# Patient Record
Sex: MALE | Race: WHITE
[De-identification: names, ages, dates, MRNs, and addresses within clinical notes are randomized per-mention and may not be internally consistent; named-entity substitution may affect disease eponyms.]

---

## 2018-12-18 ENCOUNTER — HOSPITAL ENCOUNTER (INPATIENT)
Dept: HOSPITAL 54 - ER | Age: 53
LOS: 2 days | Discharge: HOME | DRG: 303 | End: 2018-12-20
Attending: INTERNAL MEDICINE | Admitting: INTERNAL MEDICINE
Payer: COMMERCIAL

## 2018-12-18 VITALS — WEIGHT: 174.38 LBS | BODY MASS INDEX: 23.62 KG/M2 | HEIGHT: 72 IN

## 2018-12-18 VITALS — SYSTOLIC BLOOD PRESSURE: 149 MMHG | DIASTOLIC BLOOD PRESSURE: 87 MMHG

## 2018-12-18 VITALS — DIASTOLIC BLOOD PRESSURE: 80 MMHG | SYSTOLIC BLOOD PRESSURE: 114 MMHG

## 2018-12-18 DIAGNOSIS — F11.20: ICD-10-CM

## 2018-12-18 DIAGNOSIS — E78.5: ICD-10-CM

## 2018-12-18 DIAGNOSIS — I25.2: ICD-10-CM

## 2018-12-18 DIAGNOSIS — Z79.84: ICD-10-CM

## 2018-12-18 DIAGNOSIS — G89.29: ICD-10-CM

## 2018-12-18 DIAGNOSIS — Z79.82: ICD-10-CM

## 2018-12-18 DIAGNOSIS — I10: ICD-10-CM

## 2018-12-18 DIAGNOSIS — E83.42: ICD-10-CM

## 2018-12-18 DIAGNOSIS — Z82.49: ICD-10-CM

## 2018-12-18 DIAGNOSIS — K21.9: ICD-10-CM

## 2018-12-18 DIAGNOSIS — E11.65: ICD-10-CM

## 2018-12-18 DIAGNOSIS — F17.200: ICD-10-CM

## 2018-12-18 DIAGNOSIS — Z88.8: ICD-10-CM

## 2018-12-18 DIAGNOSIS — I25.10: Primary | ICD-10-CM

## 2018-12-18 LAB
BASOPHILS # BLD AUTO: 0.1 /CMM (ref 0–0.2)
BASOPHILS NFR BLD AUTO: 1.1 % (ref 0–2)
BUN SERPL-MCNC: 12 MG/DL (ref 7–18)
CALCIUM SERPL-MCNC: 8.5 MG/DL (ref 8.5–10.1)
CHLORIDE SERPL-SCNC: 104 MMOL/L (ref 98–107)
CO2 SERPL-SCNC: 28 MMOL/L (ref 21–32)
CREAT SERPL-MCNC: 0.9 MG/DL (ref 0.6–1.3)
EOSINOPHIL NFR BLD AUTO: 0.4 % (ref 0–6)
GLUCOSE SERPL-MCNC: 137 MG/DL (ref 74–106)
HCT VFR BLD AUTO: 44 % (ref 39–51)
HGB BLD-MCNC: 14.7 G/DL (ref 13.5–17.5)
LYMPHOCYTES NFR BLD AUTO: 1.3 /CMM (ref 0.8–4.8)
LYMPHOCYTES NFR BLD AUTO: 19.9 % (ref 20–44)
MCHC RBC AUTO-ENTMCNC: 33 G/DL (ref 31–36)
MCV RBC AUTO: 93 FL (ref 80–96)
MONOCYTES NFR BLD AUTO: 0.8 /CMM (ref 0.1–1.3)
MONOCYTES NFR BLD AUTO: 11.6 % (ref 2–12)
NEUTROPHILS # BLD AUTO: 4.5 /CMM (ref 1.8–8.9)
NEUTROPHILS NFR BLD AUTO: 67 % (ref 43–81)
NT-PROBNP SERPL-MCNC: 381 PG/ML (ref 0–125)
PLATELET # BLD AUTO: 211 /CMM (ref 150–450)
POTASSIUM SERPL-SCNC: 4.1 MMOL/L (ref 3.5–5.1)
RBC # BLD AUTO: 4.77 MIL/UL (ref 4.5–6)
SODIUM SERPL-SCNC: 140 MMOL/L (ref 136–145)
WBC NRBC COR # BLD AUTO: 6.7 K/UL (ref 4.3–11)

## 2018-12-18 PROCEDURE — Z7610: HCPCS

## 2018-12-18 PROCEDURE — G0480 DRUG TEST DEF 1-7 CLASSES: HCPCS

## 2018-12-18 PROCEDURE — A4606 OXYGEN PROBE USED W OXIMETER: HCPCS

## 2018-12-18 PROCEDURE — A9502 TC99M TETROFOSMIN: HCPCS

## 2018-12-18 PROCEDURE — G0378 HOSPITAL OBSERVATION PER HR: HCPCS

## 2018-12-18 RX ADMIN — HYDROCODONE BITARTRATE AND ACETAMINOPHEN PRN EA: 10; 325 TABLET ORAL at 22:29

## 2018-12-18 RX ADMIN — ATORVASTATIN CALCIUM SCH MG: 40 TABLET, FILM COATED ORAL at 22:26

## 2018-12-18 RX ADMIN — ENOXAPARIN SODIUM SCH MG: 40 INJECTION SUBCUTANEOUS at 22:28

## 2018-12-18 NOTE — NUR
received pt from [facility]. Awaiting admission for weakness, SOB, CP, inability 
to sleep.  Pt is A, O/4, able to move all extremities without difficulty, 
appears anxious.

## 2018-12-18 NOTE — NUR
BIB ra c/o weakness and sob x 2 weeks. Alert and oriented x 4, verbally 
responsive, and able to make needs known. on room air, breathing evenly, and 
unlabored. chicho on the monitor. Dr. Rojas at bedside for eval. kept 
comfortable, will continue to monitor accordingly.

## 2018-12-18 NOTE — NUR
TELE/RN NOTES



RECEIVED PT. FROM ER VIA SAV. PT. IS AWAKE, ALERT AND ORIENTED X4. BREATHING EVEN AND 
UNLABORED ON ROOM AIR. NO SOB, RESPIRATORY DISTRESS OR COMPLAINTS OF PAIN NOTED AT THIS 
TIME. ORIENTED PT. TO ROOM. PLACED EXTERNAL CARDIAC MONITOR ON PT. CURRENT RHYTHM = SINUS 
RHYTHM HR 75. PT. WITH RIGHT AC 20 GAUGE IV SALINE LOCK PRESENT, PATENT AND INTACT. PT. 
FAMILY MEMBER PRESENT AT BEDSIDE. BED LOCKED AND IN LOWEST POSITION, SIDE RAILS UP X3, CALL 
LIGHT WITHIN REACH, WILL CONTINUE TO MONITOR.

## 2018-12-19 VITALS — DIASTOLIC BLOOD PRESSURE: 76 MMHG | SYSTOLIC BLOOD PRESSURE: 118 MMHG

## 2018-12-19 VITALS — DIASTOLIC BLOOD PRESSURE: 71 MMHG | SYSTOLIC BLOOD PRESSURE: 114 MMHG

## 2018-12-19 VITALS — SYSTOLIC BLOOD PRESSURE: 126 MMHG | DIASTOLIC BLOOD PRESSURE: 68 MMHG

## 2018-12-19 VITALS — SYSTOLIC BLOOD PRESSURE: 119 MMHG | DIASTOLIC BLOOD PRESSURE: 78 MMHG

## 2018-12-19 VITALS — SYSTOLIC BLOOD PRESSURE: 122 MMHG | DIASTOLIC BLOOD PRESSURE: 75 MMHG

## 2018-12-19 LAB
BASOPHILS # BLD AUTO: 0.1 /CMM (ref 0–0.2)
BASOPHILS NFR BLD AUTO: 1 % (ref 0–2)
BUN SERPL-MCNC: 17 MG/DL (ref 7–18)
CALCIUM SERPL-MCNC: 8.9 MG/DL (ref 8.5–10.1)
CHLORIDE SERPL-SCNC: 102 MMOL/L (ref 98–107)
CHOLEST SERPL-MCNC: 67 MG/DL (ref ?–200)
CO2 SERPL-SCNC: 25 MMOL/L (ref 21–32)
CREAT SERPL-MCNC: 1 MG/DL (ref 0.6–1.3)
EOSINOPHIL NFR BLD AUTO: 0.8 % (ref 0–6)
GLUCOSE SERPL-MCNC: 150 MG/DL (ref 74–106)
HCT VFR BLD AUTO: 46 % (ref 39–51)
HDLC SERPL-MCNC: 26 MG/DL (ref 40–60)
HGB BLD-MCNC: 15.2 G/DL (ref 13.5–17.5)
LDLC SERPL DIRECT ASSAY-MCNC: 25 MG/DL (ref 0–99)
LYMPHOCYTES NFR BLD AUTO: 2.5 /CMM (ref 0.8–4.8)
LYMPHOCYTES NFR BLD AUTO: 26.3 % (ref 20–44)
MAGNESIUM SERPL-MCNC: 1.5 MG/DL (ref 1.8–2.4)
MCHC RBC AUTO-ENTMCNC: 33 G/DL (ref 31–36)
MCV RBC AUTO: 92 FL (ref 80–96)
MONOCYTES NFR BLD AUTO: 1.2 /CMM (ref 0.1–1.3)
MONOCYTES NFR BLD AUTO: 12.2 % (ref 2–12)
NEUTROPHILS # BLD AUTO: 5.8 /CMM (ref 1.8–8.9)
NEUTROPHILS NFR BLD AUTO: 59.7 % (ref 43–81)
PHOSPHATE SERPL-MCNC: 3.9 MG/DL (ref 2.5–4.9)
PLATELET # BLD AUTO: 231 /CMM (ref 150–450)
POTASSIUM SERPL-SCNC: 3.5 MMOL/L (ref 3.5–5.1)
RBC # BLD AUTO: 5.02 MIL/UL (ref 4.5–6)
SODIUM SERPL-SCNC: 139 MMOL/L (ref 136–145)
TRIGL SERPL-MCNC: 163 MG/DL (ref 30–150)
WBC NRBC COR # BLD AUTO: 9.7 K/UL (ref 4.3–11)

## 2018-12-19 RX ADMIN — HYDROCODONE BITARTRATE AND ACETAMINOPHEN PRN EA: 10; 325 TABLET ORAL at 04:31

## 2018-12-19 RX ADMIN — HYDROCODONE BITARTRATE AND ACETAMINOPHEN PRN EA: 10; 325 TABLET ORAL at 22:03

## 2018-12-19 RX ADMIN — METOPROLOL SUCCINATE SCH MG: 50 TABLET, EXTENDED RELEASE ORAL at 11:20

## 2018-12-19 RX ADMIN — MAGNESIUM SULFATE IN DEXTROSE SCH MLS/HR: 10 INJECTION, SOLUTION INTRAVENOUS at 13:53

## 2018-12-19 RX ADMIN — MORPHINE SULFATE SCH MG: 30 TABLET, EXTENDED RELEASE ORAL at 11:17

## 2018-12-19 RX ADMIN — ASPIRIN 81 MG SCH MG: 81 TABLET ORAL at 11:19

## 2018-12-19 RX ADMIN — Medication SCH MG: at 11:20

## 2018-12-19 RX ADMIN — ENOXAPARIN SODIUM SCH MG: 40 INJECTION SUBCUTANEOUS at 20:31

## 2018-12-19 RX ADMIN — METFORMIN HYDROCHLORIDE SCH MG: 500 TABLET, FILM COATED ORAL at 11:19

## 2018-12-19 RX ADMIN — METFORMIN HYDROCHLORIDE SCH MG: 500 TABLET, FILM COATED ORAL at 16:10

## 2018-12-19 RX ADMIN — ATORVASTATIN CALCIUM SCH MG: 40 TABLET, FILM COATED ORAL at 22:03

## 2018-12-19 RX ADMIN — LISINOPRIL SCH MG: 20 TABLET ORAL at 11:20

## 2018-12-19 RX ADMIN — MORPHINE SULFATE SCH MG: 30 TABLET, EXTENDED RELEASE ORAL at 20:32

## 2018-12-19 RX ADMIN — MAGNESIUM SULFATE IN DEXTROSE SCH MLS/HR: 10 INJECTION, SOLUTION INTRAVENOUS at 15:01

## 2018-12-19 RX ADMIN — HYDROCODONE BITARTRATE AND ACETAMINOPHEN PRN EA: 10; 325 TABLET ORAL at 14:37

## 2018-12-19 NOTE — NUR
MS/RN   NOTE



THE PATIENT ALERT AND ORIENTED X4. IN ROOM AIR AND DENIES SOB. SATURATION IN ROOM AIR AT 
97%. RC G 20 PATENT AND SALINE LOCKED. BED LOW AND LOCKED. SIDE RAILS UP X3. CALL LIGHT 
WITHIN REACH. WILL ENDORSE TO NIGHT SHIFT.

## 2018-12-19 NOTE — NUR
TELE/RN NOTES



PT. IS LYING IN BED. AWAKE, ALERT AND ORIENTED X4. BREATHING EVEN AND UNLABORED ON ROOM AIR. 
NO SOB OR RESPIRATORY DISTRESS NOTED AT THIS TIME AND THROUGHOUT SHIFT. NO COMPLAINTS OF 
CHEST PAIN NOTED. PT. COMPLAINING OF BACK PAIN. PAIN MEDICATION ADMINISTERED TO PT. AS 
ORDERED. PT. WITH EXTERNAL CARDIAC MONITOR PRESENT AND INTACT, PT. CURRENT RHYTHM = SINUS 
RHYTHM HR 94. PT. WITH RIGHT AC 20 GAUGE IV SALINE LOCK PRESENT, PATENT AND INTACT. ALL PT. 
NEEDS MET. BED LOCKED AND IN LOWEST POSITION, SIDE RAILS UP X3, CALL LIGHT WITHIN REACH, 
WILL ENDORSE TO DAYSHIFT NURSE FOR CONTINUITY OF CARE.

## 2018-12-19 NOTE — NUR
RN NOTES

RECEIVED PT. AWAKE ON BED, A/OX4, AMBULATORY, SR ON TELE MONITOR HR-71, DENIES PAIN AT THIS 
TIME, NO SOB, CALL LIGHT WITHIN REACH, SIDERAILSUPX2, CONTINUE TO MONITOR

## 2018-12-19 NOTE — NUR
TELE/RN   NOTE



THE PATIENT COMPLAINED OF GENERALIZED BODY RATING 9/10. THE PATIENT DESCRIBED PAIN ACHING 
AND THAT IT IS HE HAS BEEN HAVING THIS PAIN FOR MANY YEARS. MORPHINE 30 MG PO WAS GIVEN PER 
ORDER. WILL CONTINUE FOR EFFECTIVENESS.

## 2018-12-19 NOTE — NUR
TELE/RN   NOTE



STILL WAITING FOR CTA. NO PROXIMATE TIME IS GIVEN WHAT TIME CTA WILL BE DONE. THE PATIENT IS 
EXPLAINED , PATIENT AGREED. MAGNESIUM 1G IV IS STARTED.

## 2018-12-19 NOTE — NUR
TELE/RN   NOTE



CALLED THE PATIENT`S PHARMACY ([facility] [phone]), SPOKE WITH GREG AND VERIFIED 
PAIN MEDICATIONS: ACCORDING TO GREG THE PATIENT IS ON NORCO  1 TAB PO Q6HR PRN, 
MORPHINE ER 30 MG Q12HR AND SOMA 250 MG Q12 HR. ACCORDING TO GREG THE PATIENT DOES NOT HAVE 
AN ORDER OF NORCO 5/325. SINDHU FROM [facility] IS MADE AWARE. WILL FOLLOW UP 
WITH DR BRADFORD TOO.

-------------------------------------------------------------------------------

Addendum: 12/19/18 at 1055 by DWAYNE LYN RN

-------------------------------------------------------------------------------

MS/RN  NOTE



DR BRADFORD IS MADE AWARE OF WHAT GREG FROM [facility] INFORMED REGARDING 
MEDICATIONS. RECEIVED ORDER FROM DR BRADFORD TO DISCONTINUE NORCO 5/325. NOTED AND CARRIED 
OUT. DR BRADFORD GAVE NEW ORDERS OF MORPHINE SULFATE SR 30 MG PO Q12HR AND SOMA 250 MG PO Q 
12HR. ALL THE ORDERS ARE READ BACK, VERIFIED. NOTED AND CARRIED OUT.  THE PATIENT IS MADE 
AWARE.

[facility] DOES NOT HAVE SOMA 250 MG. PATIENT AND GIRLFRIEND ARE MADE 
AWARE. GIRLFRIEND WILL PROVIDE FROM HOME.

## 2018-12-19 NOTE — NUR
TELE/RN NOTES



PT. IS GETTING AGITATED, COMPLAINING ABOUT BEING IN THE HOSPITAL STATING IF HE DOESN'T GO 
OUT TO SMOKE A CIGARETTE HE IS GOING TO LEAVE THE HOSPITAL. EDUCATED PT. ABOUT IMPORTANCE OF 
STAYING ON THE HOSPITAL FLOOR AND THAT HIS HEART RATE INCREASES WHEN HE IS WALKING. PT. 
STATES HE IS NO LONGER LIGHTHEADED OR DIZZY AND THAT HE WILL GO OUT IN A WHEELCHAIR. 
INFORMED CHARGE NURSE. PT. SIGNED SMOKING CONSENT FORM. PT. LEFT THE FLOOR TO SMOKE VIA 
WHEELCHAIR ESCORTED BY CNA.

## 2018-12-19 NOTE — NUR
TELE/RN  OPENING NOTE



THE PATIENT IS RECEIVED IN SITTING UP IN CHAIR. ALERT AND ORIENTED X4. DENIES PAIN AT THIS 
TIME. RESPIRATION REGULAR AND UNLABORED. IN ROOM AIR AND DENIES SOB. EXTERNAL TELE MONITOR 
READING IS SR 85. THE PATIENT VERBALIZED WANTING TO GO HOME. THE PATIENT IS EDUCATED THE 
IMPORTANCE OF WAITING TO SEE A DOCTOR AND THE PATIENT IS ENCOURAGED NOT TO LEAVE THE 
HOSPITAL AMA. THE PATIENT IS REMINDED TO REMAIN NPO UNTIL SEEN BY A CARDIOLOGIST. THE 
PATIENT SAID " I`LL TRY, I DON`KNOW." VERBAL CUES ARE GIVEN TO KEEP SAFETY AWARENESS HIGH. 
BED LOW AND LOCKED. SIDE RAILS UP X2. CALL LIGHT WITHIN REACH. WILL CONTINUE TO MONITOR.

## 2018-12-19 NOTE — NUR
TELE/RN   NOTE



PATIENT WAS SEEN BY DR STOCK. PER DR STOCK THE PATIENT MAY HAVE FOOD AT THIS TIME AND 
ADMINISTER MEDICATIONS.

## 2018-12-19 NOTE — NUR
TELE/RN   NOTE



0900 DUE MEDICATIONS ARE GIVEN LATE DUE KEEPING PATIENT NPO UNTIL TO BE SEEN BY DR STOCK. 
THE PATIENT WAS SEEN BY DR STOCK BUT AFTERWARD THE PATIENT WENT TO WALK WITH CNA AND WAS 
BACK AT 1119 AND THAT`S THE TIME WHEN 0900 MEDICATIONS WERE GIVEN. DR BRADFORD WAS AWARE.

## 2018-12-19 NOTE — NUR
TELE/RN   NOTE



MAGNESIUM 1G IV STARTED LATE DUE TO PATIENT WANTING TO GET THE MEDICATION AFTER CTA.

## 2018-12-20 VITALS — SYSTOLIC BLOOD PRESSURE: 127 MMHG | DIASTOLIC BLOOD PRESSURE: 78 MMHG

## 2018-12-20 VITALS — DIASTOLIC BLOOD PRESSURE: 74 MMHG | SYSTOLIC BLOOD PRESSURE: 110 MMHG

## 2018-12-20 VITALS — SYSTOLIC BLOOD PRESSURE: 124 MMHG | DIASTOLIC BLOOD PRESSURE: 84 MMHG

## 2018-12-20 VITALS — DIASTOLIC BLOOD PRESSURE: 80 MMHG | SYSTOLIC BLOOD PRESSURE: 120 MMHG

## 2018-12-20 VITALS — SYSTOLIC BLOOD PRESSURE: 138 MMHG | DIASTOLIC BLOOD PRESSURE: 83 MMHG

## 2018-12-20 VITALS — DIASTOLIC BLOOD PRESSURE: 72 MMHG | SYSTOLIC BLOOD PRESSURE: 121 MMHG

## 2018-12-20 LAB
BUN SERPL-MCNC: 15 MG/DL (ref 7–18)
CALCIUM SERPL-MCNC: 8.6 MG/DL (ref 8.5–10.1)
CHLORIDE SERPL-SCNC: 106 MMOL/L (ref 98–107)
CO2 SERPL-SCNC: 26 MMOL/L (ref 21–32)
CREAT SERPL-MCNC: 1 MG/DL (ref 0.6–1.3)
GLUCOSE SERPL-MCNC: 126 MG/DL (ref 74–106)
MAGNESIUM SERPL-MCNC: 1.7 MG/DL (ref 1.8–2.4)
POTASSIUM SERPL-SCNC: 4 MMOL/L (ref 3.5–5.1)
SODIUM SERPL-SCNC: 142 MMOL/L (ref 136–145)

## 2018-12-20 RX ADMIN — ASPIRIN 81 MG SCH MG: 81 TABLET ORAL at 10:41

## 2018-12-20 RX ADMIN — MORPHINE SULFATE SCH MG: 30 TABLET, EXTENDED RELEASE ORAL at 10:41

## 2018-12-20 RX ADMIN — METOPROLOL SUCCINATE SCH MG: 50 TABLET, EXTENDED RELEASE ORAL at 10:41

## 2018-12-20 RX ADMIN — METFORMIN HYDROCHLORIDE SCH MG: 500 TABLET, FILM COATED ORAL at 16:25

## 2018-12-20 RX ADMIN — Medication SCH MG: at 10:42

## 2018-12-20 RX ADMIN — HYDROCODONE BITARTRATE AND ACETAMINOPHEN PRN EA: 10; 325 TABLET ORAL at 11:26

## 2018-12-20 RX ADMIN — METFORMIN HYDROCHLORIDE SCH MG: 500 TABLET, FILM COATED ORAL at 09:00

## 2018-12-20 RX ADMIN — MAGNESIUM SULFATE IN DEXTROSE SCH MLS/HR: 10 INJECTION, SOLUTION INTRAVENOUS at 13:59

## 2018-12-20 RX ADMIN — LISINOPRIL SCH MG: 20 TABLET ORAL at 10:41

## 2018-12-20 RX ADMIN — MAGNESIUM SULFATE IN DEXTROSE SCH MLS/HR: 10 INJECTION, SOLUTION INTRAVENOUS at 12:46

## 2018-12-20 NOTE — NUR
TELE/RN   OPENING NOTE



THE PATIENT IS RECEIVED IN BED. PATIENT IS AWAKE, ALERT AND ORIENTED X4 AND ABLE TO MAKE 
NEEDS KNOWN VERBALLY. DENIES PAIN AT THIS TIME. IN ROOM AIR AND DENIES SOB. RESPIRATION 
REGULAR AND UNLABORED. SR 76 ON EXTERNAL TELE MONITOR. PATIENT REMAINS NPO SINCE MIDNIGHT. 
THE PATIENT IN STABLE CONDITION. LFA G 20 PATENT AND SALINE LOCKED. BED LOW AND LOCKED. SIDE 
RAILS UP X3. CALL LIGHT WITHIN REACH. WILL CONTINUE TO MONITOR.

## 2018-12-20 NOTE — NUR
TELE/RN   NOTE



RECEIVED CARDIAC DIET ORDER FROM DR BRADFORD. THE ORDER IS READ BACK, VERIFIED. NOTED AND 
CARRIED OUT. ALSO, PER DR BRADFORD OK TO GIVE 0900 MEDICATIONS AND PRN MEDICATIONS.

## 2018-12-20 NOTE — NUR
RN NOTES

AWAKE, DENIES PAIN, NO SOB.,MORNING CARE RENDERED, CALL LIGHT WITHIN REACH, SIDERAILSUPX2, 
PT. NEEDS ATTENDED

## 2018-12-20 NOTE — NUR
TELE/RN   NOTE



THE PATIENT COMPLIANT OF GENERALIZED PAIN 4/10. MORPHINE 30 MG GIVEN EARLIER. REST, 
REPOSITIONING IS DONE BUT THE PATIENT STILL HAS PAIN 4/10. NORCO 10/325 1 TAB PO GIVEN. 
RESPIRATION RATE IS 19. VITALS STABLE. WILL CONTINUE TO MONITOR.

## 2018-12-20 NOTE — NUR
TELE/RN   NOTE



THE PATIENT VERBALIZED NORCO BEING EFFECTIVE AND RATED PAIN 0/10. VITAL SIGNS ARE WNL.

## 2018-12-20 NOTE — NUR
TELE/RN  CLOSING NOTE



THE PATIENT ALERT AND ORIENTED X4. DENIES SOB. IN ROOM AIR AND SATURATION IS AT 98%. DENIES 
PAIN. LFA G 20 PATENT AND SALINE LOCKED. BED LOW AND LOCKED. SIDE RAILS UP X2. CALL LIGHT 
WITHIN REACH. WILL ENDORSE TO NIGHT SHIFT.

## 2018-12-20 NOTE — NUR
MS RN DISCHARGE NOTE



PT DISCHARGED IN STABLE CONDITION, A&O X4, NO SIGNS OF SOB OR DISTRESS. CURRENT VS:108/74, 
82,98.6, 18. 98% ON RA. DISCHARGE TEACHING WITH PATIENT DONE WITH VERBALIZATION OF 
UNDERSTANDING. ALL BELONGINGS ACCOUNTED AND SIGNED FOR. WIFE AT BEDSIDE AND PT LEFT VIA 
PERSONAL VEHICLE.

## 2018-12-20 NOTE — NUR
TELE/RN   NOTE



MADE DR STOCK AWARE OF [place] RESULT AND DR STOCK SAID "OK TO DISCHARGE THE PATIENT, TELL 
DR BRADFORD TO GIVE DISCHARGE ORDER."

## 2018-12-20 NOTE — NUR
TELE/RN   NOTE



DR BRADFORD IS MADE AWARE OF DR STOCK CLEARING THE PATIENT TO GO HOME. WAITING FOR DISCHARGE 
ORDER.

## 2019-08-14 ENCOUNTER — HOSPITAL ENCOUNTER (EMERGENCY)
Dept: HOSPITAL 54 - ER | Age: 54
Discharge: HOME | End: 2019-08-14
Payer: COMMERCIAL

## 2019-08-14 VITALS — WEIGHT: 192 LBS | HEIGHT: 70 IN | BODY MASS INDEX: 27.49 KG/M2

## 2019-08-14 VITALS — SYSTOLIC BLOOD PRESSURE: 134 MMHG | DIASTOLIC BLOOD PRESSURE: 85 MMHG

## 2019-08-14 DIAGNOSIS — R11.2: ICD-10-CM

## 2019-08-14 DIAGNOSIS — E11.9: ICD-10-CM

## 2019-08-14 DIAGNOSIS — N20.0: Primary | ICD-10-CM

## 2019-08-14 DIAGNOSIS — Z88.8: ICD-10-CM

## 2019-08-14 DIAGNOSIS — I10: ICD-10-CM

## 2019-08-14 DIAGNOSIS — Z98.890: ICD-10-CM

## 2019-08-14 DIAGNOSIS — Z79.82: ICD-10-CM

## 2019-08-14 DIAGNOSIS — R10.11: ICD-10-CM

## 2019-08-14 DIAGNOSIS — Z95.5: ICD-10-CM

## 2019-08-14 DIAGNOSIS — I25.10: ICD-10-CM

## 2019-08-14 LAB
ALBUMIN SERPL BCP-MCNC: 4 G/DL (ref 3.4–5)
ALP SERPL-CCNC: 88 U/L (ref 46–116)
ALT SERPL W P-5'-P-CCNC: 12 U/L (ref 12–78)
AST SERPL W P-5'-P-CCNC: 14 U/L (ref 15–37)
BASOPHILS # BLD AUTO: 0.1 /CMM (ref 0–0.2)
BASOPHILS NFR BLD AUTO: 0.8 % (ref 0–2)
BILIRUB DIRECT SERPL-MCNC: 0.1 MG/DL (ref 0–0.2)
BILIRUB SERPL-MCNC: 0.3 MG/DL (ref 0.2–1)
BUN SERPL-MCNC: 13 MG/DL (ref 7–18)
CALCIUM SERPL-MCNC: 9 MG/DL (ref 8.5–10.1)
CHLORIDE SERPL-SCNC: 106 MMOL/L (ref 98–107)
CO2 SERPL-SCNC: 33 MMOL/L (ref 21–32)
CREAT SERPL-MCNC: 0.9 MG/DL (ref 0.6–1.3)
EOSINOPHIL NFR BLD AUTO: 2.2 % (ref 0–6)
GLUCOSE SERPL-MCNC: 106 MG/DL (ref 74–106)
HCT VFR BLD AUTO: 44 % (ref 39–51)
HGB BLD-MCNC: 14.7 G/DL (ref 13.5–17.5)
LIPASE SERPL-CCNC: 43 U/L (ref 73–393)
LYMPHOCYTES NFR BLD AUTO: 1.9 /CMM (ref 0.8–4.8)
LYMPHOCYTES NFR BLD AUTO: 21.7 % (ref 20–44)
MCHC RBC AUTO-ENTMCNC: 34 G/DL (ref 31–36)
MCV RBC AUTO: 94 FL (ref 80–96)
MONOCYTES NFR BLD AUTO: 0.6 /CMM (ref 0.1–1.3)
MONOCYTES NFR BLD AUTO: 6.4 % (ref 2–12)
NEUTROPHILS # BLD AUTO: 6 /CMM (ref 1.8–8.9)
NEUTROPHILS NFR BLD AUTO: 68.9 % (ref 43–81)
PH UR STRIP: 6 [PH] (ref 5–8)
PLATELET # BLD AUTO: 218 /CMM (ref 150–450)
POTASSIUM SERPL-SCNC: 4.6 MMOL/L (ref 3.5–5.1)
PROT SERPL-MCNC: 7.2 G/DL (ref 6.4–8.2)
RBC # BLD AUTO: 4.65 MIL/UL (ref 4.5–6)
SODIUM SERPL-SCNC: 143 MMOL/L (ref 136–145)
UROBILINOGEN UR STRIP-MCNC: 0.2 EU/DL
WBC NRBC COR # BLD AUTO: 8.6 K/UL (ref 4.3–11)

## 2019-08-14 PROCEDURE — 85025 COMPLETE CBC W/AUTO DIFF WBC: CPT

## 2019-08-14 PROCEDURE — 36415 COLL VENOUS BLD VENIPUNCTURE: CPT

## 2019-08-14 PROCEDURE — 96361 HYDRATE IV INFUSION ADD-ON: CPT

## 2019-08-14 PROCEDURE — 80048 BASIC METABOLIC PNL TOTAL CA: CPT

## 2019-08-14 PROCEDURE — 87086 URINE CULTURE/COLONY COUNT: CPT

## 2019-08-14 PROCEDURE — 81001 URINALYSIS AUTO W/SCOPE: CPT

## 2019-08-14 PROCEDURE — 76705 ECHO EXAM OF ABDOMEN: CPT

## 2019-08-14 PROCEDURE — 96374 THER/PROPH/DIAG INJ IV PUSH: CPT

## 2019-08-14 PROCEDURE — 85730 THROMBOPLASTIN TIME PARTIAL: CPT

## 2019-08-14 PROCEDURE — 74176 CT ABD & PELVIS W/O CONTRAST: CPT

## 2019-08-14 PROCEDURE — 80076 HEPATIC FUNCTION PANEL: CPT

## 2019-08-14 PROCEDURE — 99284 EMERGENCY DEPT VISIT MOD MDM: CPT

## 2019-08-14 PROCEDURE — 96375 TX/PRO/DX INJ NEW DRUG ADDON: CPT

## 2019-08-14 PROCEDURE — 83690 ASSAY OF LIPASE: CPT
